# Patient Record
Sex: FEMALE | Race: WHITE | HISPANIC OR LATINO | URBAN - METROPOLITAN AREA
[De-identification: names, ages, dates, MRNs, and addresses within clinical notes are randomized per-mention and may not be internally consistent; named-entity substitution may affect disease eponyms.]

---

## 2022-11-23 ENCOUNTER — EMERGENCY (EMERGENCY)
Facility: HOSPITAL | Age: 72
LOS: 1 days | Discharge: HOME | End: 2022-11-23
Attending: STUDENT IN AN ORGANIZED HEALTH CARE EDUCATION/TRAINING PROGRAM
Payer: MEDICARE

## 2022-11-23 VITALS
RESPIRATION RATE: 20 BRPM | OXYGEN SATURATION: 99 % | DIASTOLIC BLOOD PRESSURE: 95 MMHG | TEMPERATURE: 98 F | HEART RATE: 65 BPM | SYSTOLIC BLOOD PRESSURE: 202 MMHG

## 2022-11-23 LAB
ALBUMIN SERPL ELPH-MCNC: 3.7 G/DL — SIGNIFICANT CHANGE UP (ref 3.3–5.2)
ALP SERPL-CCNC: 68 U/L — SIGNIFICANT CHANGE UP (ref 40–120)
ALT FLD-CCNC: 11 U/L — SIGNIFICANT CHANGE UP
ANION GAP SERPL CALC-SCNC: 8 MMOL/L — SIGNIFICANT CHANGE UP (ref 5–17)
APTT BLD: 30.8 SEC — SIGNIFICANT CHANGE UP (ref 27.5–35.5)
AST SERPL-CCNC: 21 U/L — SIGNIFICANT CHANGE UP
BASOPHILS # BLD AUTO: 0.05 K/UL — SIGNIFICANT CHANGE UP (ref 0–0.2)
BASOPHILS NFR BLD AUTO: 0.6 % — SIGNIFICANT CHANGE UP (ref 0–2)
BILIRUB SERPL-MCNC: 0.3 MG/DL — LOW (ref 0.4–2)
BUN SERPL-MCNC: 10.4 MG/DL — SIGNIFICANT CHANGE UP (ref 8–20)
CALCIUM SERPL-MCNC: 9.2 MG/DL — SIGNIFICANT CHANGE UP (ref 8.4–10.5)
CHLORIDE SERPL-SCNC: 103 MMOL/L — SIGNIFICANT CHANGE UP (ref 96–108)
CO2 SERPL-SCNC: 27 MMOL/L — SIGNIFICANT CHANGE UP (ref 22–29)
CREAT SERPL-MCNC: 0.62 MG/DL — SIGNIFICANT CHANGE UP (ref 0.5–1.3)
EGFR: 93 ML/MIN/1.73M2 — SIGNIFICANT CHANGE UP
EOSINOPHIL # BLD AUTO: 1.26 K/UL — HIGH (ref 0–0.5)
EOSINOPHIL NFR BLD AUTO: 14.3 % — HIGH (ref 0–6)
GLUCOSE SERPL-MCNC: 94 MG/DL — SIGNIFICANT CHANGE UP (ref 70–99)
HCT VFR BLD CALC: 38.1 % — SIGNIFICANT CHANGE UP (ref 34.5–45)
HGB BLD-MCNC: 12.3 G/DL — SIGNIFICANT CHANGE UP (ref 11.5–15.5)
IMM GRANULOCYTES NFR BLD AUTO: 0.1 % — SIGNIFICANT CHANGE UP (ref 0–0.9)
INR BLD: 1.28 RATIO — HIGH (ref 0.88–1.16)
LYMPHOCYTES # BLD AUTO: 2.66 K/UL — SIGNIFICANT CHANGE UP (ref 1–3.3)
LYMPHOCYTES # BLD AUTO: 30.2 % — SIGNIFICANT CHANGE UP (ref 13–44)
MAGNESIUM SERPL-MCNC: 2 MG/DL — SIGNIFICANT CHANGE UP (ref 1.6–2.6)
MCHC RBC-ENTMCNC: 27.5 PG — SIGNIFICANT CHANGE UP (ref 27–34)
MCHC RBC-ENTMCNC: 32.3 GM/DL — SIGNIFICANT CHANGE UP (ref 32–36)
MCV RBC AUTO: 85 FL — SIGNIFICANT CHANGE UP (ref 80–100)
MONOCYTES # BLD AUTO: 0.97 K/UL — HIGH (ref 0–0.9)
MONOCYTES NFR BLD AUTO: 11 % — SIGNIFICANT CHANGE UP (ref 2–14)
NEUTROPHILS # BLD AUTO: 3.86 K/UL — SIGNIFICANT CHANGE UP (ref 1.8–7.4)
NEUTROPHILS NFR BLD AUTO: 43.8 % — SIGNIFICANT CHANGE UP (ref 43–77)
NT-PROBNP SERPL-SCNC: 652 PG/ML — HIGH (ref 0–300)
PLATELET # BLD AUTO: 237 K/UL — SIGNIFICANT CHANGE UP (ref 150–400)
POTASSIUM SERPL-MCNC: 4.2 MMOL/L — SIGNIFICANT CHANGE UP (ref 3.5–5.3)
POTASSIUM SERPL-SCNC: 4.2 MMOL/L — SIGNIFICANT CHANGE UP (ref 3.5–5.3)
PROT SERPL-MCNC: 6.7 G/DL — SIGNIFICANT CHANGE UP (ref 6.6–8.7)
PROTHROM AB SERPL-ACNC: 14.9 SEC — HIGH (ref 10.5–13.4)
RBC # BLD: 4.48 M/UL — SIGNIFICANT CHANGE UP (ref 3.8–5.2)
RBC # FLD: 12.9 % — SIGNIFICANT CHANGE UP (ref 10.3–14.5)
SODIUM SERPL-SCNC: 138 MMOL/L — SIGNIFICANT CHANGE UP (ref 135–145)
TROPONIN T SERPL-MCNC: <0.01 NG/ML — SIGNIFICANT CHANGE UP (ref 0–0.06)
WBC # BLD: 8.81 K/UL — SIGNIFICANT CHANGE UP (ref 3.8–10.5)
WBC # FLD AUTO: 8.81 K/UL — SIGNIFICANT CHANGE UP (ref 3.8–10.5)

## 2022-11-23 PROCEDURE — 93010 ELECTROCARDIOGRAM REPORT: CPT

## 2022-11-23 PROCEDURE — 99285 EMERGENCY DEPT VISIT HI MDM: CPT

## 2022-11-23 RX ORDER — CYCLOBENZAPRINE HYDROCHLORIDE 10 MG/1
5 TABLET, FILM COATED ORAL ONCE
Refills: 0 | Status: COMPLETED | OUTPATIENT
Start: 2022-11-23 | End: 2022-11-23

## 2022-11-23 RX ADMIN — CYCLOBENZAPRINE HYDROCHLORIDE 5 MILLIGRAM(S): 10 TABLET, FILM COATED ORAL at 21:58

## 2022-11-23 RX ADMIN — Medication 125 MILLIGRAM(S): at 21:57

## 2022-11-23 NOTE — ED ADULT NURSE NOTE - NSIMPLEMENTINTERV_GEN_ALL_ED
Implemented All Fall Risk Interventions:  [place] to call system. Call bell, personal items and telephone within reach. Instruct patient to call for assistance. Room bathroom lighting operational. Non-slip footwear when patient is off stretcher. Physically safe environment: no spills, clutter or unnecessary equipment. Stretcher in lowest position, wheels locked, appropriate side rails in place. Provide visual cue, wrist band, yellow gown, etc. Monitor gait and stability. Monitor for mental status changes and reorient to person, place, and time. Review medications for side effects contributing to fall risk. Reinforce activity limits and safety measures with patient and family.

## 2022-11-23 NOTE — ED PROVIDER NOTE - PHYSICAL EXAMINATION
General:     NAD  Head:     NC/AT, EOMI, oral mucosa moist  Neck:     trachea midline  Lungs:     CTA b/l, no w/r/r  CVS:     S1S2, RRR, no m/g/r  Abd:     +BS, s/nt/nd, no organomegaly  BACK: nt midline c/t/l/s spine. +SLR on right  Ext:    2+ radial and pedal pulses, 1+ pedal edema  Neuro: AAOx3, no sensory/motor deficits

## 2022-11-23 NOTE — ED PROVIDER NOTE - NSFOLLOWUPINSTRUCTIONS_ED_ALL_ED_FT
1) Seguimiento con mejia médico en 1-2 semanas  2) Regrese a la karlene de emergencias por empeoramiento o síntomas preocupantes    1) Follow up with your doctor in 1-2 weeks  2) Return to the ER for worsening or concerning symptoms      Edema periférico    Peripheral Edema       El edema periférico es la hinchazón causada por la acumulación de líquido. En la mayoría de los casos, el edema periférico afecta la parte inferior de las piernas, los tobillos y los pies. También puede afectar los brazos, las ni y la john. La shannon del cuerpo que presenta edema periférico se verá hinchada. También puede sentirse pesada o caliente. Es posible que sienta que la ropa comienza a apretarle. La presión sobre el área puede dejar julián andrea temporal en la piel. Blessing vez no pueda  el brazo o la pierna hinchados radha lo hace habitualmente.    Hay muchas causas posibles de edema periférico. Puede deberse a julián complicación de otras afecciones, radha insuficiencia cardíaca congestiva, enfermedad renal o un problema con la circulación sanguínea. También puede ser un efecto secundario de ciertos medicamentos o deberse a julián infección. Es frecuente lorie el embarazo. A veces, la causa es desconocida.      Siga estas instrucciones en mejia casa:      Control del dolor, la rigidez y la hinchazón      •Eleve las piernas mientras esté sentado o recostado.      •Muévase con frecuencia para evitar la rigidez y reducir la hinchazón.      • No permanezca sentado o de pie lorie largos períodos.      •Use medias de compresión radha le haya indicado mejia médico.      Medicamentos     •[place] los medicamentos de venta michelle y los recetados solamente radha se lo haya indicado el médico.      •El médico puede recetarle un medicamento para ayudar a que el cuerpo elimine el exceso de agua (diurético).      Instrucciones generales     •Esté atento a cualquier cambio en los síntomas.      •Siga las instrucciones del médico respecto de las restricciones en el consumo de sal (sodio) en la dieta. A veces, disminuir el consumo de sal puede reducir la hinchazón.      •Huméctese la piel todos los días para evitar que se agriete y se seque.      •Concurra a todas las visitas de seguimiento radha se lo haya indicado el médico. Canyon es importante.        Comuníquese con un médico si tiene:    •Fiebre.      •Edema que aparece de forma repentina o empeora, en especial si usted está embarazada o tiene julián enfermedad.      •Hinchazón en julián trinity pierna.      •Aumento de la hinchazón, el enrojecimiento o el dolor en julián pierna o en ambas.      •Secreción o llagas en la shannon donde tiene edema.        Solicite ayuda inmediatamente si:    •Le falta el aire, especialmente al estar acostado.      •Tiene dolor en el pecho o el abdomen.      •Se siente débil.      •Siente que va a desmayarse.        Resumen    •El edema periférico es la hinchazón causada por la acumulación de líquido. En la mayoría de los casos, el edema periférico afecta la parte inferior de las piernas, los tobillos y los pies.      •Muévase con frecuencia para evitar la rigidez y reducir la hinchazón. No permanezca sentado o de pie lorie largos períodos.      •Esté atento a cualquier cambio en los síntomas.      •Comuníquese con un médico si tiene un edema que aparece de forma repentina o empeora, en especial si usted está embarazada o tiene julián afección médica.      •Busque ayuda de inmediato si le falta el aire, especialmente al estar acostado.      Esta información no tiene radha fin reemplazar el consejo del médico. Asegúrese de hacerle al médico cualquier pregunta que tenga.      Peripheral Edema       Peripheral edema is swelling that is caused by a buildup of fluid. Peripheral edema most often affects the lower legs, ankles, and feet. It can also develop in the arms, hands, and face. The area of the body that has peripheral edema will look swollen. It may also feel heavy or warm. Your clothes may start to feel tight. Pressing on the area may make a temporary dent in your skin. You may not be able to move your swollen arm or leg as much as usual.    There are many causes of peripheral edema. It can happen because of a complication of other conditions such as congestive heart failure, kidney disease, or a problem with your blood circulation. It also can be a side effect of certain medicines or because of an infection. It often happens to women during pregnancy. Sometimes, the cause is not known.      Follow these instructions at home:      Managing pain, stiffness, and swelling      •Raise (elevate) your legs while you are sitting or lying down.      •Move around often to prevent stiffness and to lessen swelling.      • Do not sit or stand for long periods of time.      •Wear support stockings as told by your health care provider.      Medicines     •Take over-the-counter and prescription medicines only as told by your health care provider.      •Your health care provider may prescribe medicine to help your body get rid of excess water (diuretic).      General instructions     •Pay attention to any changes in your symptoms.      •Follow instructions from your health care provider about limiting salt (sodium) in your diet. Sometimes, eating less salt may reduce swelling.      •Moisturize skin daily to help prevent skin from cracking and draining.      •Keep all follow-up visits as told by your health care provider. This is important.        Contact a health care provider if you have:    •A fever.      •Edema that starts suddenly or is getting worse, especially if you are pregnant or have a medical condition.      •Swelling in only one leg.      •Increased swelling, redness, or pain in one or both of your legs.      •Drainage or sores at the area where you have edema.        Get help right away if you:    •Develop shortness of breath, especially when you are lying down.      •Have pain in your chest or abdomen.      •Feel weak.      •Feel faint.        Summary    •Peripheral edema is swelling that is caused by a buildup of fluid. Peripheral edema most often affects the lower legs, ankles, and feet.      •Move around often to prevent stiffness and to lessen swelling. Do not sit or stand for long periods of time.      •Pay attention to any changes in your symptoms.      •Contact a health care provider if you have edema that starts suddenly or is getting worse, especially if you are pregnant or have a medical condition.      •Get help right away if you develop shortness of breath, especially when lying down.      This information is not intended to replace advice given to you by your health care provider. Make sure you discuss any questions you have with your health care provider.      Cómo controlar el dolor de espalda crónico    Managing Chronic Back Pain      El dolor de espalda crónico es el dolor de espalda que dura 12 semanas o más. Con frecuencia afecta la shannon lumbar de la espalda. El dolor de espalda puede experimentarse radha un dolor muscular o radha un dolor ky y punzante. Puede ser leve, moderado o intenso.    Si le limon diagnosticado dolor de espalda crónico, hay cosas que puede hacer para controlar los síntomas. Blessing vez deba probar varias alternativas para determinar cuál es la más eficaz para usted. El médico también podrá darle instrucciones más específicas.      Cómo manejar los cambios en el estilo de yang    El tratamiento del dolor de espalda crónico suele comenzar con reposo y alivio del dolor, y continuar con ejercicios para restablecer el movimiento y fortalecer la espalda (fisioterapia). Es posible que deba someterse a julián cirugía si otros tratamientos no son eficaces, o si el dolor surge a raíz de julián afección o de julián lesión. Siga el plan de tratamiento radha se lo haya indicado el médico. Canyon puede incluir:  •Técnicas de relajación.      •Psicoterapia o asesoramiento psicológico con un especialista de irene mental. La terapia cognitivo conductual (TCC), julián forma de psicoterapia, puede ser especialmente útil. Esta terapia le ayuda a establecer objetivos y a realizar un seguimiento de los cambios que hace.      •Acupuntura o terapia de masajes.      •Estimulación eléctrica local.      •Inyecciones. Mediante las inyecciones, se administran anestésicos o analgésicos en la columna vertebral o en la shannon de dolor.        Cómo reconocer los cambios en el dolor de espalda crónico    La afección puede mejorar con el tratamiento. Sin embargo, el dolor de espalda puede no desaparecer o puede empeorar con el tiempo. Preste mucha atención a los síntomas e infórmele al médico si los síntomas empeoran o no mejoran.    El dolor de espalda puede empeorar si tiene lo siguiente:  •Dolor que comienza a causar problemas con la postura.      •Dolor que empeora cuando está sentado o parado, o cuando camina, se inclina o levanta peso.      •Dolor que lo afecta mientras está activo, en reposo, o en ambas ocasiones.      •Dolor que con el tiempo hace que le resulte difícil desplazarse (limita la movilidad).      •Dolor que se manifiesta con fiebre, pérdida de peso o dificultad para orinar.      •Dolor que ocasiona adormecimiento y hormigueo.        Cómo usar la mecánica del cuerpo y la postura para aliviar el dolor    La buena postura y la mecánica corporal saludable pueden ayudar a aliviar la tensión en la espalda. La mecánica corporal se refiere a los movimientos y a las posiciones del cuerpo loire las actividades diarias. La postura es julián parte de la mecánica corporal. Julián buena postura significa que:  •La columna está en mejia posición natural de curvatura en S, o posición neutra.      •Los hombros están ligeramente hacia atrás.      •La cornelio no está inclinada hacia adelante.      Siga esas pautas para mejorar la postura y la mecánica corporal en anil actividades diarias.      De pie    •Al estar de pie, mantenga la columna en la posición neutral y los pies separados al ancho de caderas, aproximadamente. Mantenga las rodillas levemente flexionadas. Las orejas, los hombros y las caderas deben estar alineados.      •Cuando realice julián tarea para la que deba estar de pie en el mismo lugar lorie mucho tiempo, apoye un pie sobre un objeto estable que tenga de 2 a 4 pulgadas (5 a 10 cm) de alto, radha un taburete. Canyon ayuda a que la columna mantenga julián posición neutral.        Sentado    •Cuando esté sentado, mantenga la columna en posición neutral y los pies apoyados en el suelo. Use un apoyapiés, si es necesario, y mantenga los muslos paralelos al suelo. Evite redondear los hombros e inclinar la cornelio hacia adelante.      •Cuando trabaje en un escritorio o con julián computadora, el escritorio debe estar a julián altura en la que las ni estén un poco más abajo que los codos. Deslice la silla debajo del escritorio, de modo de estar lo suficientemente cerca radha para mantener julián buena postura.      •Cuando trabaje con julián computadora, coloque el monitor a julián altura que le permita mirar derecho hacia adelante, sin tener que inclinar la cornelio hacia adelante o hacia atrás para domingo la pantalla.        Levantamiento de objetos    •Mantenga los pies separados, radha mínimo, el ancho de los hombros y apriete los músculos del abdomen.      •Flexione las rodillas y la cadera, y mantenga la columna en posición neutral. Asegúrese de levantar los objetos utilizando la fuerza de las piernas, no de la espalda. No trabe las rodillas hacia afuera.      •Siempre pida ayuda a otra persona para levantar objetos pesados o incómodos.        Reposo    •Al estar acostado o en reposo, evite las posiciones que le causen más dolor.      •Si siente dolor al hacer actividades que exigen sentarse, inclinarse, agacharse o ponerse en cuclillas, acuéstese en julián posición en la que el cuerpo no deba doblarse mucho. Por ejemplo, evite acurrucarse de costado con los brazos y las rodillas cerca del pecho (posición fetal).    •Si siente dolor con las actividades que exigen estar de pie lorie mucho tiempo o estirar los brazos, acuéstese con la columna en julián posición neutral y flexione ligeramente las rodillas. Intente lo siguiente:  •Acostarse de costado con julián almohada entre las rodillas.      •Acostarse boca arriba con julián almohada debajo de las rodillas.          Siga estas instrucciones en mejia casa:    Medicamentos     •El tratamiento puede incluir medicamentos para el dolor recetados o de venta michelle para el dolor y la inflamación que se jodi por boca o que se aplican sobre la piel. Otro tratamiento puede incluir relajantes musculares. Use los medicamentos de venta michelle y los recetados solamente radha se lo haya indicado el médico.    •Pregúntele al médico si el medicamento recetado:   •Hace necesario que evite conducir o usar maquinaria.     •Puede causarle estreñimiento. Es posible que tenga que cedrick estas medidas para prevenir o tratar el estreñimiento:   •Beber suficiente líquido radha para mantener la orina de color amarillo pálido.      •Usar medicamentos recetados o de venta michelle.       •Consumir alimentos ricos en fibra, radha frijoles, cereales integrales, y frutas y verduras frescas.       •Limitar el consumo de alimentos ricos en grasa y azúcares procesados, radha los alimentos fritos o dulces.          Estilo de yang     • No consuma ningún producto que contenga nicotina o tabaco, radha cigarrillos, cigarrillos electrónicos y tabaco de mascar. Si necesita ayuda para dejar de consumir estos productos, consulte al médico.      •Siga julián dieta saludable que incluya frutas, verduras, pescado y bentley magras.      •Trabaje con mejia médico para alcanzar o mantener un peso saludable.      Instrucciones generales     •Gaetano ejercicio regularmente blessing radha se lo hayan indicado. El ejercicio mejora la flexibilidad y la fuerza.      •Si le indicaron fisioterapia, gaetano los ejercicios radha se lo haya indicado el médico.      •Use terapia con hielo o con calor radha se lo haya indicado el médico.      •Concurra a todas las visitas de seguimiento radha se lo haya indicado el médico. [place] es importante.        ¿Dónde puedo obtener apoyo?    Considere la posibilidad de unirse a un irene de apoyo para personas con dolor de espalda crónico. Pregunte a mejia médico sobre grupos de apoyo de mejia shannon. También puede encontrar grupos de apoyo en línea y presenciales en los siguientes sitios:  •American Chronic Pain Association (Asociación Estadounidense del Dolor Crónico): theacpa.org      •Pain Connection Program (Programa de conexión del dolor): painconnection.org        Comuníquese con un médico si:    •Siente un dolor que no se eric con reposo o medicamentos.      •Mejia dolor empeora o tiene un dolor nuevo.      •Tiene fiebre.      •Pierde de peso con rapidez.      •Tiene dificultad para realizar las actividades cotidianas.        Solicite ayuda de inmediato si:    •Siente debilidad o adormecimiento en julián o ambas piernas, o en madina o ambos pies.      •Tiene dificultad para controlar la micción o la defecación.    •Siente un dolor intenso en la espalda y tiene alguno de los siguientes síntomas:  •Náuseas o vómitos.      •Dolor abdominal.      •Le falta el aire o se desmaya.          Resumen    •Con frecuencia, el dolor de espalda crónico se trata con descanso, alivio del dolor y fisioterapia.      •La psicoterapia, la acupuntura, los masajes y la estimulación eléctrica local pueden ayudar.      •Siga el plan de tratamiento radha se lo haya indicado el médico.      •Unirse a un irene de apoyo puede ayudarlo a manejar el dolor de espalda crónico.      Esta información no tiene radha fin reemplazar el consejo del médico. Asegúrese de hacerle al médico cualquier pregunta que tenga.    Managing Chronic Back Pain      Chronic back pain is back pain that lasts for 12 weeks or longer. It often affects the lower back. Back pain may feel like a muscle ache or a sharp, stabbing pain. It can be mild, moderate, or severe.    If you have been diagnosed with chronic back pain, there are things you can do to manage your symptoms. You may have to try different things to see what works best for you. Your health care provider may also give you specific instructions.      How to manage lifestyle changes    Treating chronic back pain often starts with rest and pain relief, followed by exercises to restore movement and strength to your back (physical therapy). You may need surgery if other treatments do not help, or if your pain is caused by a condition or an injury. Follow your treatment plan as told by your health care provider. This may include:  •Relaxation techniques.      •Talk therapy or counseling with a mental health specialist. A form of talk therapy called cognitive behavioral therapy (CBT) can be especially helpful. This therapy helps you set goals and follow up on the changes that you make.      •Acupuncture or massage therapy.      •Local electrical stimulation.      •Injections. These deliver numbing or pain-relieving medicines into your spine or the area of pain.        How to recognize changes in your chronic back pain    Your condition may improve with treatment. However, back pain may not go away or may get worse over time. Watch your symptoms carefully and let your health care provider know if your symptoms get worse or do not improve.    Your back pain may be getting worse if you have:  •Pain that begins to cause problems with posture.      •Pain that gets worse when you are sitting, standing, walking, bending, or lifting.      •Pain that affects you while you are active, or at rest, or both.      •Pain that eventually makes it hard to move around (limits mobility).      •Pain that occurs with fever, weight loss, or difficulty urinating.      •Pain that causes numbness and tingling.        How to use body mechanics and posture to help with pain    Healthy body mechanics and good posture can help to relieve stress on your back. Body mechanics refers to the movements and positions of your body during your daily activities. Posture is part of body mechanics. Good posture means:  •Your spine is in its natural S-curve, or neutral, position.      •Your shoulders are pulled back slightly.      •Your head is not tipped forward.      Follow these guidelines to improve your posture and body mechanics in your everyday activities.      Standing    •When standing, keep your spine neutral and your feet about hip-width apart. Keep your knees slightly bent. Your ears, shoulders, and hips should line up.      •When you do a task in which you  one place for a long time, place one foot on a stable object that is 2–4 inches (5–10 cm) high, such as a footstool. This helps keep your spine neutral.        Sitting    •When sitting, keep your spine neutral and your feet flat on the floor. Use a footrest, if necessary, and keep your thighs parallel to the floor. Avoid rounding your shoulders, and avoid tilting your head forward.      •When working at a desk or a computer, keep your desk at a height where your hands are slightly lower than your elbows. Slide your chair under your desk so you are close enough to maintain good posture.      •When working at a computer, place your monitor at a height where you are looking straight ahead and you do not have to tilt your head forward or downward to view the screen.        Lifting    •Keep your feet at least shoulder-width apart and tighten the muscles of your abdomen.      •Bend your knees and hips and keep your spine neutral. Be sure to lift using the strength of your legs, not your back. Do not lock your knees straight out.      •Always ask for help to lift heavy or awkward objects.        Resting    •When lying down and resting, avoid positions that are most painful.      •If you have pain with activities such as sitting, bending, stooping, or squatting, lie in a position in which your body does not bend very much. For example, avoid curling up on your side with your arms and knees near your chest (fetal position).    •If you have pain with activities such as standing for a long time or reaching with your arms, lie with your spine in a neutral position and bend your knees slightly. Try:  •Lying on your side with a pillow between your knees.      •Lying on your back with a pillow under your knees.          Follow these instructions at home:    Medicines     •Treatment may include over-the-counter or prescription medicines for pain and inflammation that are taken by mouth or applied to the skin. Another treatment may include muscle relaxants. Take over-the-counter and prescription medicines only as told by your health care provider.    •Ask your health care provider if the medicine prescribed to you:   •Requires you to avoid driving or using machinery.     •Can cause constipation. You may need to take these actions to prevent or treat constipation:   •Drink enough fluid to keep your urine pale yellow.      •Take over-the-counter or prescription medicines.       •Eat foods that are high in fiber, such as beans, whole grains, and fresh fruits and vegetables.       •Limit foods that are high in fat and processed sugars, such as fried or sweet foods.          Lifestyle     • Do not use any products that contain nicotine or tobacco, such as cigarettes, e-cigarettes, and chewing tobacco. If you need help quitting, ask your health care provider.      •Eat a healthy diet that includes foods such as vegetables, fruits, fish, and lean meats.      •Work with your health care provider to achieve or maintain a healthy weight.      General instructions     •Get regular exercise as told. Exercise improves flexibility and strength.      •If physical therapy was prescribed, do exercises as told by your health care provider.      •Use ice or heat therapy as told by your health care provider.      •Keep all follow-up visits as told by your health care provider. This is important.        Where can I get support?    Consider joining a support group for people managing chronic back pain. Ask your health care provider about support groups in your area. You can also find online and in-person support groups through:  •The American Chronic Pain Association: theacpa.org      •Pain Connection Program: painconnection.org        Contact a health care provider if:    •You have pain that is not relieved with rest or medicine.      •Your pain gets worse, or you have new pain.      •You have a fever.      •You have rapid weight loss.      •You have trouble doing your normal activities.        Get help right away if:    •You have weakness or numbness in one or both of your legs or feet.      •You have trouble controlling your bladder or your bowels.    •You have severe back pain and have any of the following:  •Nausea or vomiting.      •Abdominal pain.      •Shortness of breath or you faint.          Summary    •Chronic back pain is often treated with rest, pain relief, and physical therapy.      •Talk therapy, acupuncture, massage, and local electrical stimulation may help.      •Follow your treatment plan as told by your health care provider.      •Joining a support group may help you manage chronic back pain.      This information is not intended to replace advice given to you by your health care provider. Make sure you discuss any questions you have with your health care provider.

## 2022-11-23 NOTE — ED ADULT NURSE NOTE - OBJECTIVE STATEMENT
c/o left hip pain with radiation to left leg x a few months. Pts daughter stated her pain has been consistent in the left hip and radiates to the left leg with numbness/tingling. Pt has been seen here prior for similar event, given pain meds and DC. Pt denies CP, dizziness, SOB, N/V/D, fevers, chills. Pt AOx4, speaking coherently, respirations even and unlabored on RA, skin warm and dry, able to move all extremities. Bed locked in the lowest position side rails up.

## 2022-11-23 NOTE — ED PROVIDER NOTE - CLINICAL SUMMARY MEDICAL DECISION MAKING FREE TEXT BOX
patient with leg swelling and pain. also c/o back pain radiating to legs. likely sciatica. will do labs, us legs, give f/up with spine and for pcp.

## 2022-11-23 NOTE — ED PROVIDER NOTE - PATIENT PORTAL LINK FT
You can access the FollowMyHealth Patient Portal offered by [facility] by registering at the following website: [email or web address]. By joining [employer]’s FollowMyHealth portal, you will also be able to view your health information using other applications (apps) compatible with our system.

## 2022-11-23 NOTE — ED PROVIDER NOTE - NS ED ROS FT
Constitutional: (-) fever  (-)chills  (-)sweats  Eyes/ENT: (-)   Cardiovascular: (-) chest pain, (-) palpitations (+) edema   Respiratory: (-) cough, (-) shortness of breath   Gastrointestinal: (-)nausea  (-)vomiting, (-) diarrhea  (-) abdominal pain   :  (-)dysuria, (-)frequency, (-)urgency, (-)hematuria  Musculoskeletal: (-) neck pain, (+ back pain, (-) joint pain  Integumentary: (-) rash, (-) edema  Neurological: (-) headache, (-) altered mental status  (-)LOC

## 2022-11-23 NOTE — ED ADULT TRIAGE NOTE - CHIEF COMPLAINT QUOTE
72 F nad c/o Left lower back pain that radiates down left leg to foot worse at night, denies injury.

## 2022-11-23 NOTE — ED PROVIDER NOTE - OBJECTIVE STATEMENT
72yoF; with PMH signif for HTN, HLD; now p/w back pain--lower back pain, radiating to bilateral legs, with paresthesia.  denies weakness. c/o leg swelling as well. denies cp/sob/palp. denies abd pain. denies n/v. denies bowel/bladder incontinence.  denies f/c/s. denies cough.  PMH: HTN, HLD  SOCIAL: denies smoking / denies illicit substance use

## 2022-11-24 VITALS
HEART RATE: 81 BPM | OXYGEN SATURATION: 98 % | DIASTOLIC BLOOD PRESSURE: 79 MMHG | TEMPERATURE: 98 F | SYSTOLIC BLOOD PRESSURE: 130 MMHG | RESPIRATION RATE: 17 BRPM

## 2022-11-24 LAB
APPEARANCE UR: CLEAR — SIGNIFICANT CHANGE UP
BILIRUB UR-MCNC: NEGATIVE — SIGNIFICANT CHANGE UP
COLOR SPEC: YELLOW — SIGNIFICANT CHANGE UP
DIFF PNL FLD: NEGATIVE — SIGNIFICANT CHANGE UP
EPI CELLS # UR: SIGNIFICANT CHANGE UP
GLUCOSE UR QL: NEGATIVE MG/DL — SIGNIFICANT CHANGE UP
KETONES UR-MCNC: NEGATIVE — SIGNIFICANT CHANGE UP
LEUKOCYTE ESTERASE UR-ACNC: ABNORMAL
NITRITE UR-MCNC: NEGATIVE — SIGNIFICANT CHANGE UP
PH UR: 7 — SIGNIFICANT CHANGE UP (ref 5–8)
PROT UR-MCNC: NEGATIVE — SIGNIFICANT CHANGE UP
RBC CASTS # UR COMP ASSIST: SIGNIFICANT CHANGE UP /HPF (ref 0–4)
SP GR SPEC: 1.01 — SIGNIFICANT CHANGE UP (ref 1.01–1.02)
UROBILINOGEN FLD QL: NEGATIVE MG/DL — SIGNIFICANT CHANGE UP
WBC UR QL: SIGNIFICANT CHANGE UP /HPF (ref 0–5)

## 2022-11-24 PROCEDURE — 71045 X-RAY EXAM CHEST 1 VIEW: CPT | Mod: 26

## 2022-11-24 PROCEDURE — 72100 X-RAY EXAM L-S SPINE 2/3 VWS: CPT

## 2022-11-24 PROCEDURE — 72100 X-RAY EXAM L-S SPINE 2/3 VWS: CPT | Mod: 26

## 2022-11-24 PROCEDURE — 85730 THROMBOPLASTIN TIME PARTIAL: CPT

## 2022-11-24 PROCEDURE — 96374 THER/PROPH/DIAG INJ IV PUSH: CPT

## 2022-11-24 PROCEDURE — 83735 ASSAY OF MAGNESIUM: CPT

## 2022-11-24 PROCEDURE — 71045 X-RAY EXAM CHEST 1 VIEW: CPT

## 2022-11-24 PROCEDURE — 93970 EXTREMITY STUDY: CPT | Mod: 26

## 2022-11-24 PROCEDURE — 80053 COMPREHEN METABOLIC PANEL: CPT

## 2022-11-24 PROCEDURE — 84484 ASSAY OF TROPONIN QUANT: CPT

## 2022-11-24 PROCEDURE — 85025 COMPLETE CBC W/AUTO DIFF WBC: CPT

## 2022-11-24 PROCEDURE — 83880 ASSAY OF NATRIURETIC PEPTIDE: CPT

## 2022-11-24 PROCEDURE — 85610 PROTHROMBIN TIME: CPT

## 2022-11-24 PROCEDURE — 93970 EXTREMITY STUDY: CPT

## 2022-11-24 PROCEDURE — 93005 ELECTROCARDIOGRAM TRACING: CPT

## 2022-11-24 PROCEDURE — 99285 EMERGENCY DEPT VISIT HI MDM: CPT | Mod: 25

## 2022-11-24 PROCEDURE — 36415 COLL VENOUS BLD VENIPUNCTURE: CPT

## 2022-11-24 PROCEDURE — 81001 URINALYSIS AUTO W/SCOPE: CPT

## 2022-11-24 NOTE — ED ADULT NURSE REASSESSMENT NOTE - NS ED NURSE REASSESS COMMENT FT1
Pt resting comfortably, stated she is still feeling slight pain down leg, but manageable. Pt able to ambulate with steady gait, Aox4, speaking coherently, respirations even and unlabored on RA, skin warm and dry.

## 2023-01-20 PROBLEM — Z00.00 ENCOUNTER FOR PREVENTIVE HEALTH EXAMINATION: Status: ACTIVE | Noted: 2023-01-20

## 2023-02-02 ENCOUNTER — APPOINTMENT (OUTPATIENT)
Dept: INTERNAL MEDICINE | Facility: CLINIC | Age: 73
End: 2023-02-02
Payer: MEDICARE

## 2023-02-02 ENCOUNTER — NON-APPOINTMENT (OUTPATIENT)
Age: 73
End: 2023-02-02

## 2023-02-02 VITALS
DIASTOLIC BLOOD PRESSURE: 80 MMHG | HEIGHT: 57 IN | BODY MASS INDEX: 30.63 KG/M2 | SYSTOLIC BLOOD PRESSURE: 160 MMHG | WEIGHT: 142 LBS

## 2023-02-02 DIAGNOSIS — M79.674 PAIN IN RIGHT TOE(S): ICD-10-CM

## 2023-02-02 DIAGNOSIS — E03.9 HYPOTHYROIDISM, UNSPECIFIED: ICD-10-CM

## 2023-02-02 DIAGNOSIS — Z13.820 ENCOUNTER FOR SCREENING FOR OSTEOPOROSIS: ICD-10-CM

## 2023-02-02 DIAGNOSIS — Z12.39 ENCOUNTER FOR OTHER SCREENING FOR MALIGNANT NEOPLASM OF BREAST: ICD-10-CM

## 2023-02-02 DIAGNOSIS — G47.00 INSOMNIA, UNSPECIFIED: ICD-10-CM

## 2023-02-02 DIAGNOSIS — H91.93 UNSPECIFIED HEARING LOSS, BILATERAL: ICD-10-CM

## 2023-02-02 DIAGNOSIS — Z12.11 ENCOUNTER FOR SCREENING FOR MALIGNANT NEOPLASM OF COLON: ICD-10-CM

## 2023-02-02 DIAGNOSIS — Z00.00 ENCOUNTER FOR GENERAL ADULT MEDICAL EXAMINATION W/OUT ABNORMAL FINDINGS: ICD-10-CM

## 2023-02-02 DIAGNOSIS — E55.9 VITAMIN D DEFICIENCY, UNSPECIFIED: ICD-10-CM

## 2023-02-02 DIAGNOSIS — R79.9 ABNORMAL FINDING OF BLOOD CHEMISTRY, UNSPECIFIED: ICD-10-CM

## 2023-02-02 DIAGNOSIS — F41.9 ANXIETY DISORDER, UNSPECIFIED: ICD-10-CM

## 2023-02-02 PROCEDURE — G0439: CPT

## 2023-02-02 RX ORDER — GABAPENTIN 300 MG/1
300 CAPSULE ORAL
Qty: 90 | Refills: 1 | Status: ACTIVE | COMMUNITY
Start: 2023-02-02 | End: 1900-01-01

## 2023-02-02 RX ORDER — LOSARTAN POTASSIUM 50 MG/1
50 TABLET, FILM COATED ORAL
Refills: 0 | Status: ACTIVE | COMMUNITY

## 2023-02-02 RX ORDER — ZOLPIDEM TARTRATE 10 MG/1
10 TABLET ORAL
Refills: 0 | Status: COMPLETED | COMMUNITY
End: 2023-02-02

## 2023-02-02 RX ORDER — ATORVASTATIN CALCIUM 80 MG/1
80 TABLET, FILM COATED ORAL
Refills: 0 | Status: ACTIVE | COMMUNITY

## 2023-02-02 RX ORDER — GABAPENTIN 100 MG
100 TABLET ORAL
Refills: 0 | Status: COMPLETED | COMMUNITY
End: 2023-02-02

## 2023-02-02 RX ORDER — DICLOFENAC POTASSIUM 25 MG/1
25 CAPSULE, LIQUID FILLED ORAL
Refills: 0 | Status: ACTIVE | COMMUNITY

## 2023-02-02 RX ORDER — LEVOTHYROXINE SODIUM 50 MCG
50 TABLET ORAL
Refills: 0 | Status: ACTIVE | COMMUNITY

## 2023-02-02 NOTE — HEALTH RISK ASSESSMENT
[0] : 2) Feeling down, depressed, or hopeless: Not at all (0) [PHQ-2 Negative - No further assessment needed] : PHQ-2 Negative - No further assessment needed [[id number]] : 0 [HIV test declined] : HIV test declined [Hepatitis C test declined] : Hepatitis C test declined

## 2023-02-02 NOTE — PLAN
[FreeTextEntry1] : In regards to patients Physical exam, routine blood work drawn, will review results with patient.\par \par In regards to colon cancer screening- Cologuard ordered\par \par In regards to patient Breast cancer screening, Mammogram ordered for patient\par \par Osteoporosis screening- will obtain DEXA scan and call patient with results \par \par History of left hip pain- patient will continue to follow up with Pain management Dr. Montano\par \par HTN- patient's BP well controlled with current medication. will continue current  regimen \par \par Insomnia- patient will take Gabapentin 300 mg PO QHS\par \par Counseling included abnormal lab results, differential diagnoses, treatment options, risks and benefits, lifestyle changes, current condition, medications, and dose adjustments. \par The patient was interactive, attentive, asked questions, and verbalized understanding \par

## 2023-02-02 NOTE — HISTORY OF PRESENT ILLNESS
Might suggest sending a picture if no better   [Family Member] : family member [FreeTextEntry1] : Physical exam [de-identified] : Ms. LOVE GARY is a 72 year female with a PMH of CVA (October 2021 in [place] with no known residual weakness) HLD, HTN, Hypothyroidism, anxiety, recently was brought [place] this [place] by  Her daughter Asuncion who is in the office with her today.

## 2023-02-03 LAB
25(OH)D3 SERPL-MCNC: 44.8 NG/ML
ALBUMIN SERPL ELPH-MCNC: 4.3 G/DL
ALP BLD-CCNC: 65 U/L
ALT SERPL-CCNC: 13 U/L
ANION GAP SERPL CALC-SCNC: 10 MMOL/L
AST SERPL-CCNC: 23 U/L
BASOPHILS # BLD AUTO: 0.05 K/UL
BASOPHILS NFR BLD AUTO: 0.6 %
BILIRUB SERPL-MCNC: 0.5 MG/DL
BUN SERPL-MCNC: 16 MG/DL
CALCIUM SERPL-MCNC: 9.6 MG/DL
CHLORIDE SERPL-SCNC: 105 MMOL/L
CHOLEST SERPL-MCNC: 151 MG/DL
CO2 SERPL-SCNC: 29 MMOL/L
CREAT SERPL-MCNC: 0.89 MG/DL
EGFR: 68 ML/MIN/1.73M2
EOSINOPHIL # BLD AUTO: 0.94 K/UL
EOSINOPHIL NFR BLD AUTO: 12.1 %
ESTIMATED AVERAGE GLUCOSE: 111 MG/DL
GLUCOSE SERPL-MCNC: 65 MG/DL
HBA1C MFR BLD HPLC: 5.5 %
HCT VFR BLD CALC: 41.7 %
HDLC SERPL-MCNC: 48 MG/DL
HGB BLD-MCNC: 12.9 G/DL
IMM GRANULOCYTES NFR BLD AUTO: 0.3 %
LDLC SERPL CALC-MCNC: 83 MG/DL
LYMPHOCYTES # BLD AUTO: 2.41 K/UL
LYMPHOCYTES NFR BLD AUTO: 30.9 %
MAN DIFF?: NORMAL
MCHC RBC-ENTMCNC: 27.3 PG
MCHC RBC-ENTMCNC: 30.9 GM/DL
MCV RBC AUTO: 88.3 FL
MONOCYTES # BLD AUTO: 0.75 K/UL
MONOCYTES NFR BLD AUTO: 9.6 %
NEUTROPHILS # BLD AUTO: 3.62 K/UL
NEUTROPHILS NFR BLD AUTO: 46.5 %
NONHDLC SERPL-MCNC: 103 MG/DL
PLATELET # BLD AUTO: 228 K/UL
POTASSIUM SERPL-SCNC: 4.5 MMOL/L
PROT SERPL-MCNC: 7 G/DL
RBC # BLD: 4.72 M/UL
RBC # FLD: 14.5 %
SODIUM SERPL-SCNC: 144 MMOL/L
TRIGL SERPL-MCNC: 97 MG/DL
TSH SERPL-ACNC: 2.21 UIU/ML
WBC # FLD AUTO: 7.79 K/UL

## 2023-02-14 DIAGNOSIS — R92.8 OTHER ABNORMAL AND INCONCLUSIVE FINDINGS ON DIAGNOSTIC IMAGING OF BREAST: ICD-10-CM

## 2023-02-15 ENCOUNTER — APPOINTMENT (OUTPATIENT)
Dept: INTERNAL MEDICINE | Facility: CLINIC | Age: 73
End: 2023-02-15

## 2023-02-24 ENCOUNTER — NON-APPOINTMENT (OUTPATIENT)
Age: 73
End: 2023-02-24

## 2023-02-28 ENCOUNTER — NON-APPOINTMENT (OUTPATIENT)
Age: 73
End: 2023-02-28

## 2023-02-28 ENCOUNTER — APPOINTMENT (OUTPATIENT)
Dept: CARDIOLOGY | Facility: CLINIC | Age: 73
End: 2023-02-28
Payer: MEDICARE

## 2023-02-28 ENCOUNTER — APPOINTMENT (OUTPATIENT)
Dept: INTERNAL MEDICINE | Facility: CLINIC | Age: 73
End: 2023-02-28
Payer: MEDICARE

## 2023-02-28 VITALS
WEIGHT: 143 LBS | BODY MASS INDEX: 30.85 KG/M2 | SYSTOLIC BLOOD PRESSURE: 120 MMHG | HEIGHT: 57 IN | DIASTOLIC BLOOD PRESSURE: 70 MMHG

## 2023-02-28 VITALS
HEIGHT: 57 IN | DIASTOLIC BLOOD PRESSURE: 72 MMHG | SYSTOLIC BLOOD PRESSURE: 150 MMHG | WEIGHT: 143 LBS | BODY MASS INDEX: 30.85 KG/M2 | RESPIRATION RATE: 16 BRPM | HEART RATE: 60 BPM

## 2023-02-28 DIAGNOSIS — H81.10 BENIGN PAROXYSMAL VERTIGO, UNSPECIFIED EAR: ICD-10-CM

## 2023-02-28 DIAGNOSIS — G45.9 TRANSIENT CEREBRAL ISCHEMIC ATTACK, UNSPECIFIED: ICD-10-CM

## 2023-02-28 DIAGNOSIS — R60.0 LOCALIZED EDEMA: ICD-10-CM

## 2023-02-28 DIAGNOSIS — R94.31 ABNORMAL ELECTROCARDIOGRAM [ECG] [EKG]: ICD-10-CM

## 2023-02-28 DIAGNOSIS — E78.5 HYPERLIPIDEMIA, UNSPECIFIED: ICD-10-CM

## 2023-02-28 DIAGNOSIS — I10 ESSENTIAL (PRIMARY) HYPERTENSION: ICD-10-CM

## 2023-02-28 DIAGNOSIS — R06.02 SHORTNESS OF BREATH: ICD-10-CM

## 2023-02-28 PROCEDURE — 93000 ELECTROCARDIOGRAM COMPLETE: CPT

## 2023-02-28 PROCEDURE — 99204 OFFICE O/P NEW MOD 45 MIN: CPT | Mod: 25

## 2023-02-28 PROCEDURE — 99214 OFFICE O/P EST MOD 30 MIN: CPT

## 2023-02-28 RX ORDER — TRAMADOL HYDROCHLORIDE 25 MG/1
TABLET, COATED ORAL
Refills: 0 | Status: ACTIVE | COMMUNITY

## 2023-02-28 RX ORDER — MECLIZINE HYDROCHLORIDE 25 MG/1
25 TABLET ORAL 3 TIMES DAILY
Qty: 90 | Refills: 1 | Status: ACTIVE | COMMUNITY
Start: 2023-02-28 | End: 1900-01-01

## 2023-02-28 RX ORDER — SERTRALINE 25 MG/1
25 TABLET, FILM COATED ORAL
Qty: 90 | Refills: 3 | Status: ACTIVE | COMMUNITY
Start: 1900-01-01 | End: 1900-01-01

## 2023-02-28 NOTE — DISCUSSION/SUMMARY
[FreeTextEntry1] : 1.  Check echocardiogram and nuclear stress test to further evaluate her abnormal EKG as well as given her history of TIA and hypertension.\par 2.  Check carotid Doppler given history of TIA.\par 3.  Continue current cardiac meds in doses as noted above for hypertension, dyslipidemia and TIA.\par 4.  Consider aspirin therapy at follow-up.\par 5.  Monitor BP at home, keep a log and bring to f/u.\par 6.  Encourage patient to be active.\par 7.  Follow up here after testing, and will make further recommendations at that time. [EKG obtained to assist in diagnosis and management of assessed problem(s)] : EKG obtained to assist in diagnosis and management of assessed problem(s)

## 2023-02-28 NOTE — PLAN
[FreeTextEntry1] : BPPV- patient prescribed meclizine, patient has follow up with ENT on 03/02/23\par patient referred to neurologist\par \par HTN- patient's BP well controlled with current medication. will continue current  regimen \par \par Dependent edema- patient will take lasix QOD and will follow with cardiologist for further evaluation and management. \par \par Prior to appointment and during encounter with patient extensive medical records were reviewed including but not limited to, Hospital records records, out patient records, laboratory data and microbiology data \par In addition extensive time was also spent in reviewing diagnostic studies.\par \par Total encounter total time 30 mins\par >50% of time spent counseling/coordinating care\par \par Counseling included abnormal lab results, differential diagnoses, treatment options, risks and benefits, lifestyle changes, current condition, medications, and dose adjustments. \par The patient was interactive, attentive, asked questions, and verbalized understanding

## 2023-02-28 NOTE — HISTORY OF PRESENT ILLNESS
[FreeTextEntry1] : Patient presents to the office today for evaluation to establish care.  She recently started with a new primary doctor who recommended she follow-up with me.  She does have a history of apparently TIAs around 3 years ago or more.  She also has a history of hypertension and dyslipidemia.  She has chronic issues with edema that been going on for over a year but have actually been better recently.  She uses Lasix for that and it does not really bother her.  She reports brief sudden episodes of shortness of breath that occur primarily at rest.  She will be sitting on a couch and suddenly noticed a little bit of shortness of breath and take a few deep breaths and then feel better.  She does not really have any exertional symptoms at all.  She does have chronic issues with back pain for which she has injections and is going to get more.  Patient denies chest pain, palpitations, orthopnea, presyncope, syncope.

## 2023-02-28 NOTE — HISTORY OF PRESENT ILLNESS
[FreeTextEntry1] : vertigo [de-identified] : Ms. LOVE GARY is a 72 year female with a PMH of CVA (October 2021 in [place] with no known residual weakness) HLD, HTN, Hypothyroidism, anxiety, recently was brought to this country by  Her daughter Asuncion who is in the office with her today.

## 2023-02-28 NOTE — PHYSICAL EXAM
[No Acute Distress] : no acute distress [Well Nourished] : well nourished [Well Developed] : well developed [Well-Appearing] : well-appearing [Normal Sclera/Conjunctiva] : normal sclera/conjunctiva [PERRL] : pupils equal round and reactive to light [EOMI] : extraocular movements intact [Normal Outer Ear/Nose] : the outer ears and nose were normal in appearance [Normal Oropharynx] : the oropharynx was normal [No JVD] : no jugular venous distention [No Lymphadenopathy] : no lymphadenopathy [Supple] : supple [No Respiratory Distress] : no respiratory distress  [No Accessory Muscle Use] : no accessory muscle use [Clear to Auscultation] : lungs were clear to auscultation bilaterally [Normal Rate] : normal rate  [Regular Rhythm] : with a regular rhythm [Normal S1, S2] : normal S1 and S2 [No Carotid Bruits] : no carotid bruits [No Abdominal Bruit] : a ~M bruit was not heard ~T in the abdomen [No Varicosities] : no varicosities [No Edema] : there was no peripheral edema [No Palpable Aorta] : no palpable aorta [No Extremity Clubbing/Cyanosis] : no extremity clubbing/cyanosis [Soft] : abdomen soft [Non Tender] : non-tender [Non-distended] : non-distended [No HSM] : no HSM [Normal Bowel Sounds] : normal bowel sounds [Normal Posterior Cervical Nodes] : no posterior cervical lymphadenopathy [Normal Anterior Cervical Nodes] : no anterior cervical lymphadenopathy [No CVA Tenderness] : no CVA  tenderness [No Spinal Tenderness] : no spinal tenderness [No Joint Swelling] : no joint swelling [Grossly Normal Strength/Tone] : grossly normal strength/tone [No Rash] : no rash [Coordination Grossly Intact] : coordination grossly intact [Normal Affect] : the affect was normal [Normal Insight/Judgement] : insight and judgment were intact [de-identified] : positive Epley maneuver

## 2023-02-28 NOTE — ASSESSMENT
[FreeTextEntry1] : EKG: Sinus rhythm with anterior T wave inversions.\par \par 72-year-old female with a past medical history of TIA, hypertension, dyslipidemia, chronic lower extremity edema who presents to me for cardiac evaluation.  Patient is mostly asymptomatic at this time.  She reports some occasional brief episodes of shortness of breath occurring at rest which may be secondary just to shallow breathing.  It does not appear to be significant from a cardiovascular standpoint.  She does not really have any exertional complaints.  EKG does show anterior T wave inversions.  I do not have an old EKG to compare to at this time.  I recommend additional cardiac testing given her history and the findings of her EKG.  No evidence of heart failure at this time.  She does not have any significant edema for now on Lasix but has issues with chronic edema likely from CVI.  Review of blood work shows good control of her lipids and no evidence of diabetes.

## 2023-02-28 NOTE — PHYSICAL EXAM
[Well Developed] : well developed [Well Nourished] : well nourished [No Acute Distress] : no acute distress [Normal Conjunctiva] : normal conjunctiva [Normal Venous Pressure] : normal venous pressure [No Carotid Bruit] : no carotid bruit [Normal S1, S2] : normal S1, S2 [No Rub] : no rub [No Gallop] : no gallop [Clear Lung Fields] : clear lung fields [Good Air Entry] : good air entry [No Respiratory Distress] : no respiratory distress  [Soft] : abdomen soft [Non Tender] : non-tender [No Masses/organomegaly] : no masses/organomegaly [Normal Bowel Sounds] : normal bowel sounds [Normal Gait] : normal gait [No Edema] : no edema [No Cyanosis] : no cyanosis [No Clubbing] : no clubbing [No Varicosities] : no varicosities [No Rash] : no rash [No Skin Lesions] : no skin lesions [Moves all extremities] : moves all extremities [No Focal Deficits] : no focal deficits [Normal Speech] : normal speech [Alert and Oriented] : alert and oriented [Normal memory] : normal memory [S4] : S4 [de-identified] : 2/6 systolic ejection murmur

## 2023-02-28 NOTE — HEALTH RISK ASSESSMENT
[0] : 2) Feeling down, depressed, or hopeless: Not at all (0) [PHQ-2 Negative - No further assessment needed] : PHQ-2 Negative - No further assessment needed [Never] : Never [[id number]] : 0

## 2023-03-02 ENCOUNTER — APPOINTMENT (OUTPATIENT)
Dept: OTOLARYNGOLOGY | Facility: CLINIC | Age: 73
End: 2023-03-02
Payer: MEDICARE

## 2023-03-02 VITALS
DIASTOLIC BLOOD PRESSURE: 89 MMHG | WEIGHT: 143 LBS | BODY MASS INDEX: 30.85 KG/M2 | HEART RATE: 59 BPM | HEIGHT: 57 IN | SYSTOLIC BLOOD PRESSURE: 166 MMHG

## 2023-03-02 DIAGNOSIS — H91.90 UNSPECIFIED HEARING LOSS, UNSPECIFIED EAR: ICD-10-CM

## 2023-03-02 PROCEDURE — 92567 TYMPANOMETRY: CPT

## 2023-03-02 PROCEDURE — 99204 OFFICE O/P NEW MOD 45 MIN: CPT

## 2023-03-02 PROCEDURE — 92557 COMPREHENSIVE HEARING TEST: CPT

## 2023-03-02 NOTE — HISTORY OF PRESENT ILLNESS
[de-identified] : 72 year old female referred by Dr. Galloway for bilateral hearing loss. \par Patient has had hearing loss for 15+ years. Never worn hearing aids. Hearing loss was gradual. There Left ear is the better ear. Reports both ear itch a lot. Reports intermittent right otalgia and bilateral intermittent tinnitus.\par Recently prescribed Meclizine by PCP for vertigo.\par Denies otorrhea, recent fevers or ear infections, tinnitus, headaches related to hearing, ear surgeries. \par No history of head/otologic trauma, no chemo therapy, IV antibiotics or ototoxins. Denies loud noise exposure.

## 2023-03-02 NOTE — REASON FOR VISIT
[Initial Consultation] : an initial consultation for [Other: _____] : [unfilled] [FreeTextEntry2] : referred by Dr. Galloway for bilateral hearing loss.

## 2023-03-12 ENCOUNTER — OUTPATIENT (OUTPATIENT)
Dept: OUTPATIENT SERVICES | Facility: HOSPITAL | Age: 73
LOS: 1 days | End: 2023-03-12
Payer: MEDICARE

## 2023-03-12 ENCOUNTER — APPOINTMENT (OUTPATIENT)
Dept: MRI IMAGING | Facility: CLINIC | Age: 73
End: 2023-03-12
Payer: MEDICARE

## 2023-03-12 DIAGNOSIS — H91.90 UNSPECIFIED HEARING LOSS, UNSPECIFIED EAR: ICD-10-CM

## 2023-03-12 PROCEDURE — 70553 MRI BRAIN STEM W/O & W/DYE: CPT

## 2023-03-12 PROCEDURE — 70553 MRI BRAIN STEM W/O & W/DYE: CPT | Mod: 26

## 2023-03-14 ENCOUNTER — NON-APPOINTMENT (OUTPATIENT)
Age: 73
End: 2023-03-14

## 2023-03-27 ENCOUNTER — APPOINTMENT (OUTPATIENT)
Dept: CARDIOLOGY | Facility: CLINIC | Age: 73
End: 2023-03-27
Payer: MEDICARE

## 2023-03-27 PROCEDURE — 93306 TTE W/DOPPLER COMPLETE: CPT

## 2023-03-27 PROCEDURE — 93880 EXTRACRANIAL BILAT STUDY: CPT

## 2023-04-03 ENCOUNTER — APPOINTMENT (OUTPATIENT)
Dept: CARDIOLOGY | Facility: CLINIC | Age: 73
End: 2023-04-03

## 2023-04-14 ENCOUNTER — NON-APPOINTMENT (OUTPATIENT)
Age: 73
End: 2023-04-14

## 2023-04-18 ENCOUNTER — APPOINTMENT (OUTPATIENT)
Dept: CARDIOLOGY | Facility: CLINIC | Age: 73
End: 2023-04-18

## 2023-05-30 ENCOUNTER — RX RENEWAL (OUTPATIENT)
Age: 73
End: 2023-05-30

## 2023-05-30 RX ORDER — FUROSEMIDE 20 MG/1
20 TABLET ORAL
Qty: 45 | Refills: 0 | Status: ACTIVE | COMMUNITY
Start: 2023-05-30 | End: 1900-01-01

## 2023-06-06 ENCOUNTER — APPOINTMENT (OUTPATIENT)
Dept: CARDIOLOGY | Facility: CLINIC | Age: 73
End: 2023-06-06
Payer: MEDICARE
